# Patient Record
Sex: FEMALE | Race: WHITE | NOT HISPANIC OR LATINO | Employment: FULL TIME | URBAN - METROPOLITAN AREA
[De-identification: names, ages, dates, MRNs, and addresses within clinical notes are randomized per-mention and may not be internally consistent; named-entity substitution may affect disease eponyms.]

---

## 2023-09-30 ENCOUNTER — APPOINTMENT (OUTPATIENT)
Dept: RADIOLOGY | Facility: CLINIC | Age: 48
End: 2023-09-30
Attending: EMERGENCY MEDICINE
Payer: COMMERCIAL

## 2023-09-30 ENCOUNTER — HOSPITAL ENCOUNTER (EMERGENCY)
Facility: CLINIC | Age: 48
Discharge: HOME | End: 2023-09-30
Attending: EMERGENCY MEDICINE | Admitting: EMERGENCY MEDICINE
Payer: COMMERCIAL

## 2023-09-30 VITALS
OXYGEN SATURATION: 99 % | DIASTOLIC BLOOD PRESSURE: 70 MMHG | TEMPERATURE: 97.8 F | HEIGHT: 63 IN | SYSTOLIC BLOOD PRESSURE: 119 MMHG | BODY MASS INDEX: 42.58 KG/M2 | RESPIRATION RATE: 18 BRPM | WEIGHT: 240.3 LBS | HEART RATE: 68 BPM

## 2023-09-30 DIAGNOSIS — R07.9 NONSPECIFIC CHEST PAIN: ICD-10-CM

## 2023-09-30 LAB
ANION GAP SERPL CALCULATED.3IONS-SCNC: 12 MMOL/L (ref 7–15)
ATRIAL RATE - MUSE: 78 BPM
BASOPHILS # BLD AUTO: 0.1 10E3/UL (ref 0–0.2)
BASOPHILS NFR BLD AUTO: 1 %
BUN SERPL-MCNC: 22.3 MG/DL (ref 6–20)
CALCIUM SERPL-MCNC: 9.5 MG/DL (ref 8.6–10)
CHLORIDE SERPL-SCNC: 107 MMOL/L (ref 98–107)
CREAT SERPL-MCNC: 0.98 MG/DL (ref 0.51–0.95)
DEPRECATED HCO3 PLAS-SCNC: 23 MMOL/L (ref 22–29)
DIASTOLIC BLOOD PRESSURE - MUSE: 74 MMHG
EGFRCR SERPLBLD CKD-EPI 2021: 71 ML/MIN/1.73M2
EOSINOPHIL # BLD AUTO: 0.2 10E3/UL (ref 0–0.7)
EOSINOPHIL NFR BLD AUTO: 3 %
ERYTHROCYTE [DISTWIDTH] IN BLOOD BY AUTOMATED COUNT: 13.2 % (ref 10–15)
GLUCOSE SERPL-MCNC: 92 MG/DL (ref 70–99)
HCT VFR BLD AUTO: 40.8 % (ref 35–47)
HGB BLD-MCNC: 13.6 G/DL (ref 11.7–15.7)
HOLD SPECIMEN: NORMAL
HOLD SPECIMEN: NORMAL
IMM GRANULOCYTES # BLD: 0 10E3/UL
IMM GRANULOCYTES NFR BLD: 1 %
INTERPRETATION ECG - MUSE: NORMAL
LYMPHOCYTES # BLD AUTO: 1.6 10E3/UL (ref 0.8–5.3)
LYMPHOCYTES NFR BLD AUTO: 24 %
MCH RBC QN AUTO: 29 PG (ref 26.5–33)
MCHC RBC AUTO-ENTMCNC: 33.3 G/DL (ref 31.5–36.5)
MCV RBC AUTO: 87 FL (ref 78–100)
MONOCYTES # BLD AUTO: 0.4 10E3/UL (ref 0–1.3)
MONOCYTES NFR BLD AUTO: 6 %
NEUTROPHILS # BLD AUTO: 4.5 10E3/UL (ref 1.6–8.3)
NEUTROPHILS NFR BLD AUTO: 65 %
NRBC # BLD AUTO: 0 10E3/UL
NRBC BLD AUTO-RTO: 0 /100
NT-PROBNP SERPL-MCNC: 242 PG/ML (ref 0–450)
P AXIS - MUSE: 63 DEGREES
PLATELET # BLD AUTO: 186 10E3/UL (ref 150–450)
POTASSIUM SERPL-SCNC: 4.2 MMOL/L (ref 3.4–5.3)
PR INTERVAL - MUSE: 194 MS
QRS DURATION - MUSE: 96 MS
QT - MUSE: 412 MS
QTC - MUSE: 469 MS
R AXIS - MUSE: 35 DEGREES
RBC # BLD AUTO: 4.69 10E6/UL (ref 3.8–5.2)
SODIUM SERPL-SCNC: 142 MMOL/L (ref 135–145)
SYSTOLIC BLOOD PRESSURE - MUSE: 128 MMHG
T AXIS - MUSE: 25 DEGREES
TROPONIN T SERPL HS-MCNC: 6 NG/L
TROPONIN T SERPL HS-MCNC: <6 NG/L
VENTRICULAR RATE- MUSE: 78 BPM
WBC # BLD AUTO: 6.7 10E3/UL (ref 4–11)

## 2023-09-30 PROCEDURE — 84484 ASSAY OF TROPONIN QUANT: CPT | Performed by: EMERGENCY MEDICINE

## 2023-09-30 PROCEDURE — 80048 BASIC METABOLIC PNL TOTAL CA: CPT | Performed by: EMERGENCY MEDICINE

## 2023-09-30 PROCEDURE — 36415 COLL VENOUS BLD VENIPUNCTURE: CPT | Performed by: EMERGENCY MEDICINE

## 2023-09-30 PROCEDURE — 83880 ASSAY OF NATRIURETIC PEPTIDE: CPT | Performed by: EMERGENCY MEDICINE

## 2023-09-30 PROCEDURE — 71045 X-RAY EXAM CHEST 1 VIEW: CPT

## 2023-09-30 PROCEDURE — 99285 EMERGENCY DEPT VISIT HI MDM: CPT | Mod: 25

## 2023-09-30 PROCEDURE — 85025 COMPLETE CBC W/AUTO DIFF WBC: CPT | Performed by: EMERGENCY MEDICINE

## 2023-09-30 PROCEDURE — 93005 ELECTROCARDIOGRAM TRACING: CPT | Performed by: EMERGENCY MEDICINE

## 2023-09-30 PROCEDURE — 85014 HEMATOCRIT: CPT | Performed by: EMERGENCY MEDICINE

## 2023-09-30 ASSESSMENT — ACTIVITIES OF DAILY LIVING (ADL): ADLS_ACUITY_SCORE: 35

## 2023-09-30 NOTE — ED PROVIDER NOTES
EMERGENCY DEPARTMENT ENCOUNTER      NAME: Jeannine Carpenter  AGE: 48 year old female  YOB: 1975  MRN: [id number]  EVALUATION DATE & TIME: 2023  2:10 PM    PCP: Kofi Pride    ED PROVIDER: Mac De Souza D.O.      Chief Complaint   Patient presents with    Chest Pain       FINAL IMPRESSION:  1. Nonspecific chest pain        ED COURSE & MEDICAL DECISION MAKIN:24 PM I met with the patient to gather history and to perform my initial exam. I discussed the plan for care while in the Emergency Department.   2:35 PM Medtronic cardiac device check ordered.   4:32 PM I rechecked and updated the patient on test results. We discussed the plan for discharge and the patient is agreeable. We reviewed supportive cares, symptomatic treatment, outpatient follow up, and reasons to return to the Emergency Department.         Pertinent Labs & Imaging studies reviewed. (See chart for details)  48 year old female presents to the Emergency Department for evaluation of left-sided chest pain with radiation to her left arm for a brief period of time.  Patient does have a pacemaker defibrillator due to previous known history of heart failure, which she did have weight loss surgery to try to reverse.  Initial differential does include ACS, PE, dissection, pneumothorax, infectious process, other acute process.  We did interrogate her pacemaker and there is no obvious events that would have caused her symptoms today.  EKG did not show any evidence of ischemia arrhythmia, 2 troponins were both negative.  Clinically there is no concern for PE as she is not tachycardic, not hypoxic, and otherwise PERC negative.  BNP was not elevated, no evidence of heart strain or CHF.  At this time, I do not see obvious emergent cause, but do not believe the patient requires admission with the negative troponins, and will have her follow-up as an outpatient with her cardiologist.  Return precautions were discussed    Medical Decision  Making    History:  Supplemental history from: N/A  External Record(s) reviewed: Outpatient Record    Work Up:  Chart documentation includes differential considered and any EKGs or imaging independently interpreted by provider, where specified.  In additional to work up documented, I considered the following work up: Documented in chart, if applicable.    External consultation:  Discussion of management with another provider: Documented in chart, if applicable    Complicating factors:  Care impacted by chronic illness: Chronic Kidney Disease and Heart Disease  Care affected by social determinants of health: N/A    Disposition considerations: Discharge. No recommendations on prescription strength medication(s). See documentation for any additional details.        At the conclusion of the encounter I discussed the results of all of the tests and the disposition. The questions were answered. The patient or family acknowledged understanding and was agreeable with the care plan.        HPI    Patient information was obtained from: patient    Use of : N/A        Jeannine Carpenter is a 48 year old female with a history of chronic systolic CHF (EF 53% on 9/2/2022 echo), nonischemic dilated cardiomyopathy, non-rheumatic tricuspid valve insufficiency, SVT with Medtronic ICD in place, HLD, CKD stage III, NAFLD, bilateral PE not currently anticoagulated, MIGUEL, asthma, GERD, and morbid obesity s/p laparoscopic sleeve gastrectomy, who presents via private vehicle with family member for evaluation of chest pain.     Patient reports sudden onset of left sided chest pain that radiated down her left upper extremity with associated paresthesia and numbness of the left hand while at work. This lasted approximately 30 minutes and spontaneously resolved without recurrence. Currently, patient denies any active chest pain and endorses only mild dyspnea. Patient otherwise denies fevers, cough, congestion, sore throat,  "lightheadedness, palpitations, or any other symptoms or concerns at this time.        REVIEW OF SYSTEMS  Constitutional:  Denies fever, chills, weight loss or weakness  Eyes:  No pain, discharge, redness  HENT:  Denies sore throat, ear pain, congestion  Respiratory: Endorses mild SOB. Denies wheeze or cough  Cardiovascular:  No CP (resolved, left sided), palpitations  GI:  Denies abdominal pain, nausea, vomiting, diarrhea  : Denies dysuria, hematuria  Musculoskeletal:  Denies any new muscle/joint pain, swelling or loss of function.  Skin:  Denies rash, pallor  Neurologic:  Denies headache, lightheadedness, focal weakness or sensory changes (resolved, paresthesia of the LUE and numbness of the left hand).  Lymph: Denies swollen nodes    All other systems negative unless noted in HPI.    PAST MEDICAL HISTORY:  No past medical history on file.    PAST SURGICAL HISTORY:  No past surgical history on file.      CURRENT MEDICATIONS:    No current facility-administered medications for this encounter.     No current outpatient medications on file.         ALLERGIES:  Allergies   Allergen Reactions    Latex Itching     Gloves, condoms    Hydrocodone-Acetaminophen Nausea and Vomiting     Tolerates norco       FAMILY HISTORY:  No family history on file.    SOCIAL HISTORY:  Social History     Socioeconomic History    Marital status:        VITALS:  Patient Vitals for the past 24 hrs:   BP Temp Temp src Pulse Resp SpO2 Height Weight   09/30/23 1647 119/70 -- -- 68 -- 99 % -- --   09/30/23 1500 106/59 -- -- 65 -- 99 % -- --   09/30/23 1448 113/59 -- -- 66 -- 100 % -- --   09/30/23 1445 114/60 -- -- 63 -- 99 % -- --   09/30/23 1315 128/74 -- -- 82 -- -- -- --   09/30/23 1313 -- 97.8  F (36.6  C) Oral 63 18 98 % 1.6 m (5' 3\") 109 kg (240 lb 4.8 oz)       PHYSICAL EXAM    VITAL SIGNS: /70   Pulse 68   Temp 97.8  F (36.6  C) (Oral)   Resp 18   Ht 1.6 m (5' 3\")   Wt 109 kg (240 lb 4.8 oz)   SpO2 99%   BMI 42.57 " kg/m      General Appearance: Well-appearing, well-nourished, no acute distress   Head:  Normocephalic, without obvious abnormality, atraumatic  Eyes:  PERRL, conjunctiva/corneas clear, EOM's intact,  ENT:  Lips, mucosa, and tongue normal, membranes are moist without pallor  Neck:  Normal ROM, symmetrical, trachea midline    Cardio:  Regular rate and rhythm, no murmur, rub or gallop, 2+ pulses symmetric in all extremities  Pulm:  Clear to auscultation bilaterally, respirations unlabored,  Musculoskeletal: Full ROM, no edema, no cyanosis, good ROM of major joints  Integument:  Warm, Dry, No erythema, No rash.    Neurologic:  Alert & oriented.  No focal deficits appreciated.  Ambulatory.  Psychiatric:  Affect normal, Judgment normal, Mood normal.      LABS  Results for orders placed or performed during the hospital encounter of 09/30/23 (from the past 24 hour(s))   ECG 12-LEAD WITH MUSE (E)   Result Value Ref Range    Systolic Blood Pressure 128 mmHg    Diastolic Blood Pressure 74 mmHg    Ventricular Rate 78 BPM    Atrial Rate 78 BPM    ME Interval 194 ms    QRS Duration 96 ms     ms    QTc 469 ms    P Axis 63 degrees    R AXIS 35 degrees    T Axis 25 degrees    Interpretation ECG       Sinus rhythm  Cannot rule out Anterior infarct , age undetermined  Abnormal ECG  No previous ECGs available  Confirmed by SEE ED PROVIDER NOTE FOR, ECG INTERPRETATION ([id number]),  Tammy Peng ([id number]) on 9/30/2023 3:29:34 PM     CBC with Platelets & Differential    Narrative    The following orders were created for panel order CBC with Platelets & Differential.  Procedure                               Abnormality         Status                     ---------                               -----------         ------                     CBC with platelets and d...[[id number]]                      Final result                 Please view results for these tests on the individual orders.   Basic metabolic panel   Result  Value Ref Range    Sodium 142 135 - 145 mmol/L    Potassium 4.2 3.4 - 5.3 mmol/L    Chloride 107 98 - 107 mmol/L    Carbon Dioxide (CO2) 23 22 - 29 mmol/L    Anion Gap 12 7 - 15 mmol/L    Urea Nitrogen 22.3 (H) 6.0 - 20.0 mg/dL    Creatinine 0.98 (H) 0.51 - 0.95 mg/dL    GFR Estimate 71 >60 mL/min/1.73m2    Calcium 9.5 8.6 - 10.0 mg/dL    Glucose 92 70 - 99 mg/dL   Troponin T, High Sensitivity   Result Value Ref Range    Troponin T, High Sensitivity 6 <=14 ng/L   [facility] Draw    Narrative    The following orders were created for panel order [facility] Draw.  Procedure                               Abnormality         Status                     ---------                               -----------         ------                     Extra Blue Top Tube[[id number]]                              Final result               Extra Red Top Tube[[id number]]                               Final result                 Please view results for these tests on the individual orders.   CBC with platelets and differential   Result Value Ref Range    WBC Count 6.7 4.0 - 11.0 10e3/uL    RBC Count 4.69 3.80 - 5.20 10e6/uL    Hemoglobin 13.6 11.7 - 15.7 g/dL    Hematocrit 40.8 35.0 - 47.0 %    MCV 87 78 - 100 fL    MCH 29.0 26.5 - 33.0 pg    MCHC 33.3 31.5 - 36.5 g/dL    RDW 13.2 10.0 - 15.0 %    Platelet Count 186 150 - 450 10e3/uL    % Neutrophils 65 %    % Lymphocytes 24 %    % Monocytes 6 %    % Eosinophils 3 %    % Basophils 1 %    % Immature Granulocytes 1 %    NRBCs per 100 WBC 0 <1 /100    Absolute Neutrophils 4.5 1.6 - 8.3 10e3/uL    Absolute Lymphocytes 1.6 0.8 - 5.3 10e3/uL    Absolute Monocytes 0.4 0.0 - 1.3 10e3/uL    Absolute Eosinophils 0.2 0.0 - 0.7 10e3/uL    Absolute Basophils 0.1 0.0 - 0.2 10e3/uL    Absolute Immature Granulocytes 0.0 <=0.4 10e3/uL    Absolute NRBCs 0.0 10e3/uL   Extra Blue Top Tube   Result Value Ref Range    Hold Specimen JIC    Extra Red Top Tube   Result Value Ref Range    Hold Specimen JIC    N terminal  pro BNP outpatient   Result Value Ref Range    N Terminal Pro BNP Outpatient 242 0 - 450 pg/mL   XR Chest 1 View    Narrative    EXAM: XR CHEST 1 VIEW  LOCATION: [facility]  DATE: 9/30/2023    INDICATION: Chest pain  COMPARISON: None.      Impression    IMPRESSION: Left pacemaker with leads overlying the right ventricle. Heart normal in size. Lungs are clear.   Troponin T, High Sensitivity (now)   Result Value Ref Range    Troponin T, High Sensitivity <6 <=14 ng/L         RADIOLOGY  XR Chest 1 View   Final Result   IMPRESSION: Left pacemaker with leads overlying the right ventricle. Heart normal in size. Lungs are clear.           I have independently interpreted the above image, no obvious consolidation or pneumothorax on chest x-ray. See radiology report for detail.    EKG:    Rate: 78bpm  Rhythm: Normal Sinus Rhythm  Axis: Normal  Interval: Normal  Conduction: Normal  QRS: Normal  ST: Normal  T-wave: Normal  QT: Not prolonged  Comparison EKG: no previous available for comparison  Impression:  No acute ischemic change   I have independently reviewed and interpreted today's EKG, pending Cardiologist read        MEDICATIONS GIVEN IN THE EMERGENCY:  Medications - No data to display    NEW PRESCRIPTIONS STARTED AT TODAY'S ER VISIT  There are no discharge medications for this patient.       I, Rossana Storey, am serving as a scribe to document services personally performed by Mac De Souza D.O., based on my observations and the provider's statements to me.  I, Mac De Souza D.O., attest that Rossana Storey is acting in a scribe capacity, has observed my performance of the services and has documented them in accordance with my direction.     Mac De Souza D.O.  Emergency Medicine  [facility] EMERGENCY ROOM  [address] [address]  [phone]  Dept: [phone]      Mac De Souza DO  09/30/23 2009